# Patient Record
Sex: FEMALE | Race: BLACK OR AFRICAN AMERICAN | Employment: FULL TIME | URBAN - METROPOLITAN AREA
[De-identification: names, ages, dates, MRNs, and addresses within clinical notes are randomized per-mention and may not be internally consistent; named-entity substitution may affect disease eponyms.]

---

## 2018-09-14 ENCOUNTER — HOSPITAL ENCOUNTER (EMERGENCY)
Age: 33
Discharge: HOME | End: 2018-09-14
Attending: EMERGENCY MEDICINE
Payer: MEDICAID

## 2018-09-14 ENCOUNTER — APPOINTMENT (OUTPATIENT)
Dept: GENERAL RADIOLOGY | Age: 33
End: 2018-09-14
Attending: PHYSICIAN ASSISTANT
Payer: MEDICAID

## 2018-09-14 VITALS
SYSTOLIC BLOOD PRESSURE: 163 MMHG | OXYGEN SATURATION: 98 % | DIASTOLIC BLOOD PRESSURE: 111 MMHG | RESPIRATION RATE: 18 BRPM | HEART RATE: 68 BPM | TEMPERATURE: 98.4 F

## 2018-09-14 DIAGNOSIS — S92.252A CLOSED DISPLACED FRACTURE OF NAVICULAR BONE OF LEFT FOOT, INITIAL ENCOUNTER: Primary | ICD-10-CM

## 2018-09-14 DIAGNOSIS — R03.0 ELEVATED BLOOD PRESSURE READING: ICD-10-CM

## 2018-09-14 PROCEDURE — 73630 X-RAY EXAM OF FOOT: CPT

## 2018-09-14 PROCEDURE — 75810000053 HC SPLINT APPLICATION

## 2018-09-14 PROCEDURE — 74011250637 HC RX REV CODE- 250/637: Performed by: EMERGENCY MEDICINE

## 2018-09-14 PROCEDURE — 99283 EMERGENCY DEPT VISIT LOW MDM: CPT

## 2018-09-14 RX ORDER — HYDROCODONE BITARTRATE AND ACETAMINOPHEN 5; 325 MG/1; MG/1
1 TABLET ORAL
Qty: 10 TAB | Refills: 0 | Status: SHIPPED | OUTPATIENT
Start: 2018-09-14

## 2018-09-14 RX ORDER — IBUPROFEN 600 MG/1
600 TABLET ORAL
Status: COMPLETED | OUTPATIENT
Start: 2018-09-14 | End: 2018-09-14

## 2018-09-14 RX ADMIN — IBUPROFEN 600 MG: 600 TABLET ORAL at 23:19

## 2018-09-14 NOTE — LETTER
NOTIFICATION RETURN TO WORK / SCHOOL 
 
9/14/2018 11:25 PM 
 
Ms. Juan J Jha [address] [address] To Whom It May Concern: 
 
Juan J Jha is currently under the care of [address] [place] [address] EMERGENCY DEPT. She will return to work/school on: 9/17/18 Nonweightbearing left foot until cleared by orthopedics If there are questions or concerns please have the patient contact our office.  
 
 
 
Sincerely, 
 
 
Michelle Sarah MD

## 2018-09-15 NOTE — DISCHARGE INSTRUCTIONS
Broken Foot: Care Instructions  Your Care Instructions    A broken foot, or foot fracture, is a break in one or more of the bones in your foot. It may happen because of a sports injury, a fall, or other accident. A compound, or open, fracture occurs when a bone breaks through the skin. A break that does not poke through the skin is a closed fracture. Your treatment depends on the location and type of break in your foot. You may need a splint, a cast, or an orthopedic shoe. Certain kinds of injuries may need surgery at some time. Whatever your treatment, you can ease symptoms and help your foot heal with care at home. You may need 6 to 8 weeks or more to fully heal.  You heal best when you take good care of yourself. Eat a variety of healthy foods, and don't smoke. Follow-up care is a key part of your treatment and safety. Be sure to make and go to all appointments, and call your doctor if you are having problems. It's also a good idea to know your test results and keep a list of the medicines you take. How can you care for yourself at home? · Be safe with medicines. Take pain medicines exactly as directed. ¨ If the doctor gave you a prescription medicine for pain, take it as prescribed. ¨ If you are not taking a prescription pain medicine, ask your doctor if you can take an over-the-counter medicine. · Leave the splint on until your follow-up appointment. Do not put any weight on the injured foot. If you were given crutches, use them as directed. · Put ice or a cold pack on your foot for 10 to 20 minutes at a time. Try to do this every 1 to 2 hours for the next 3 days (when you are awake) or until the swelling goes down. Put a thin cloth between the ice and your skin. · Prop up the sore foot on a pillow anytime you sit or lie down during the next 3 days. Try to keep it above the level of your heart. This will help reduce swelling. · Follow the cast care instructions your doctor gives you.  If you have a splint, do not take it off unless your doctor tells you to. Cast and splint care  · If you have a removable splint, ask your doctor if it is okay to remove it to bathe. Your doctor may want you to keep it on as much as possible. · Keep your plaster splint covered by taping a sheet of plastic around it when you bathe. Water under the plaster can cause your skin to itch and hurt. · Never cut your splint. When should you call for help? Call 911 anytime you think you may need emergency care. For example, call if:    · You have chest pain, are short of breath, or you cough up blood.    Call your doctor now or seek immediate medical care if:    · You have new or worse pain.     · Your foot is cool or pale or changes color.     · You have tingling, weakness, or numbness in your toes.     · Your cast or splint feels too tight.     · You have signs of a blood clot in your leg (called a deep vein thrombosis), such as:  ¨ Pain in your calf, back of the knee, thigh, or groin. ¨ Redness or swelling in your leg.    Watch closely for changes in your health, and be sure to contact your doctor if:    · You have a problem with your splint or cast.     · You do not get better as expected. Where can you learn more? Go to http://gigi-priscilla.info/. Enter S121 in the search box to learn more about \"Broken Foot: Care Instructions. \"  Current as of: November 29, 2017  Content Version: 11.7  © 7084-8585 [employer]. Care instructions adapted under license by [employer] (which disclaims liability or warranty for this information). If you have questions about a medical condition or this instruction, always ask your healthcare professional. [address] any warranty or liability for your use of this information.

## 2018-09-15 NOTE — ED PROVIDER NOTES
EMERGENCY DEPARTMENT HISTORY AND PHYSICAL EXAM 
 
10:47 PM 
 
 
Date: 9/14/2018 Patient Name: Rico Hatfield History of Presenting Illness Chief Complaint Patient presents with  Foot Pain History Provided By: Patient Chief Complaint: foot pain Duration:  5 hours Timing:  Constant Location: left foot Severity: 6 out of 10 Modifying Factors: Pt tripped over a baby toy. She did not fall or hit her head Associated Symptoms: Denies knee pain, HA, and fever. Additional History (Context): Rico Hatfield is a 27 y.o. female with No significant past medical history who presents with constant 6/10 left foot pain that started 5 hours ago. Pt tripped over a baby toy. She did not fall or hit her head She did not take anything for pain PTA. Denies knee pain, HA, and fever. Pt says they are not able to put weight on the foot. PCP: Zack Herbert MD 
 
 
 
Past History Past Medical History: No past medical history on file. Past Surgical History: No past surgical history on file. Family History: No family history on file. Social History: 
Social History Substance Use Topics  Smoking status: Not on file  Smokeless tobacco: Not on file  Alcohol use Not on file Allergies: Allergies Allergen Reactions  Amoxicillin Hives Review of Systems Review of Systems Constitutional: Negative for diaphoresis and fever. HENT: Negative for congestion. Eyes: Negative for discharge. Respiratory: Negative for stridor. Cardiovascular: Negative for palpitations. Gastrointestinal: Negative for diarrhea. Genitourinary: Negative for flank pain. Musculoskeletal: Negative for back pain. Positive for left foot pain. Negative for knee pain. Neurological: Negative for weakness and headaches. Psychiatric/Behavioral: Negative for hallucinations. All other systems reviewed and are negative. Visit Vitals  BP (!) 163/111 (BP 1 Location: Right arm, BP Patient Position: At rest)  Pulse 68  Temp 98.4 °F (36.9 °C)  Resp 18  SpO2 98% Physical Exam / Medical Decision Making I am the first provider for this patient. I reviewed the vital signs, available nursing notes, past medical history, past surgical history, family history and social history. Vital Signs-Reviewed the patient's vital signs. Physical exam: 
General:  Well-developed, well-nourished, no apparent distress Head:  Normocephalic atraumatic Eyes:  Pupils midrange extraocular movements grossly intact. Nose:  No rhinorrhea, inspection grossly normal   
Ears:  Grossly normal to inspection Mouth:  Mucous membranes moist   
Neck/Back:  Trachea midline, no asymmetry Chest:  Grossly normal inspection, symmetric chest rise, respirations nonlabored Extremities:  Grossly normal to inspection  LEFT lower extremity: No pain on palpation of the knee, there is marked pain on palpation of the dorsum of the LEFT foot there is no medial or lateral malleolus tenderness. 2+ dorsalis pedis pulses she has sensation intact to light touch. There is swelling overlying the dorsum of the foot. No  clear deformity or ecchymosis Neurologic:  Alert and oriented no appreciable focal neurologic deficit Psychiatric:  Grossly normal mood and affect Nursing note reviewed, vital signs reviewed. ED course: 
Patient with a exaggerated plantar flexion injury at her LEFT ankle after tripping, has pain overlying the dorsum of the foot no medial or lateral malleolus tenderness no concern that this is a ankle fracture, primary concern is for a midfoot injury or ligamentous injury. Declined a pregnancy test she was offered pain medication and opted for a ibuprofen by mouth X-ray per my interpretation avulsion fracture or dorsum of the LEFT foot navicular bone Procedure note: 
Splint Location:  Ankle Performed by staff under my direct supervision Splint type:  Posterior short-leg Material:  Kerricristela Mcdaniel Post procedure: body part was neurologically unchanged and patient tolerated procedure well Total time: 5 minutes Patient's presentation, history, physical exam and laboratory evaluations were reviewed. At this time patient was felt to be stable for outpatient management and follow with primary care/specialist.  Patient was instructed to return to the emergency department with any concerns. Disposition: 
 
Discharged home Portions of this chart were created with Dragon medical speech to text program.   Unrecognized errors may be present. Diagnostic Study Results Labs - No results found for this or any previous visit (from the past 12 hour(s)). Radiologic Studies -  
XR FOOT LT MIN 3 V    (Results Pending) Diagnosis Clinical Impression: 1. Closed displaced fracture of navicular bone of left foot, initial encounter 2. Elevated blood pressure reading Follow-up Information Follow up With Details Comments Contact Info Hawk Ruiz MD Call in 2 days  [place] [address] [address] [place] [address] 
[phone] [facility] EMERGENCY DEPT  As needed, If symptoms worsen [address] [facility] 
[phone] Patient's Medications Start Taking HYDROCODONE-ACETAMINOPHEN (NORCO) 5-325 MG PER TABLET    Take 1 Tab by mouth every four (4) hours as needed for Pain. Max Daily Amount: 6 Tabs. Continue Taking No medications on file These Medications have changed No medications on file Stop Taking No medications on file  
 
_______________________________ Attestations: 
Scribe Attestation Maryam Thomas acting as a scribe for and in the presence of Ibrahima Luo MD     
September 14, 2018 at 10:47 PM 
    
Provider Attestation: I personally performed the services described in the documentation, reviewed the documentation, as recorded by the scribe in my presence, and it accurately and completely records my words and actions. September 14, 2018 at 10:47 PM - Julianne Yang MD   
_______________________________

## 2023-01-13 ENCOUNTER — OFFICE VISIT (OUTPATIENT)
Dept: SURGERY | Age: 38
End: 2023-01-13
Payer: MEDICAID

## 2023-01-13 VITALS
RESPIRATION RATE: 16 BRPM | BODY MASS INDEX: 44.56 KG/M2 | OXYGEN SATURATION: 99 % | SYSTOLIC BLOOD PRESSURE: 143 MMHG | HEIGHT: 64 IN | WEIGHT: 261 LBS | DIASTOLIC BLOOD PRESSURE: 93 MMHG | TEMPERATURE: 98.1 F | HEART RATE: 80 BPM

## 2023-01-13 DIAGNOSIS — K21.9 GASTROESOPHAGEAL REFLUX DISEASE, UNSPECIFIED WHETHER ESOPHAGITIS PRESENT: ICD-10-CM

## 2023-01-13 DIAGNOSIS — J45.909 ASTHMA, UNSPECIFIED ASTHMA SEVERITY, UNSPECIFIED WHETHER COMPLICATED, UNSPECIFIED WHETHER PERSISTENT: ICD-10-CM

## 2023-01-13 DIAGNOSIS — D50.9 IRON DEFICIENCY ANEMIA, UNSPECIFIED IRON DEFICIENCY ANEMIA TYPE: ICD-10-CM

## 2023-01-13 DIAGNOSIS — Z01.818 PRE-OP TESTING: ICD-10-CM

## 2023-01-13 DIAGNOSIS — E66.01 MORBID OBESITY DUE TO EXCESS CALORIES (HCC): Primary | ICD-10-CM

## 2023-01-13 DIAGNOSIS — I10 HYPERTENSION, UNSPECIFIED TYPE: ICD-10-CM

## 2023-01-13 RX ORDER — AMLODIPINE BESYLATE 10 MG/1
10 TABLET ORAL DAILY
COMMUNITY

## 2023-01-13 NOTE — PROGRESS NOTES
Frida Bae is a 42 y.o. female (: 1985) presenting to address:    Chief Complaint   Patient presents with    New Patient     Bariatric consult       Medication list and allergies have been reviewed with Frida Bae and updated as of today's date. I have gone over all Medical, Surgical and Social History with Frida Bae and updated/added the information accordingly.

## 2023-01-13 NOTE — PROGRESS NOTES
Bariatric Surgery Consultation    CC: Consultation regarding surgical treatment options for morbid obesity and related comorbidities  Subjective: The patient is a 42 y.o. obese  female with a Body mass index is 44.8 kg/m². . They have been considering surgery for some time now. The patient presents to the clinic today to discuss surgical weight loss options. They have made multiple attempts at weight loss over the years without success. They have tried low-carb, low calorie, high-protein diets. Unfortunately, none of these have lead to meaningful, sustained weight loss. They have attended the bariatric seminar before the visit. Denies nausea, vomiting, dysphagia  Rare reflux, intermittent, triggered by carbonated beverages or red sauces, self-limited. EXERCISE:   Walking regimen    Sleep Apnea assessment:    STOPBANG questionnaire     Do you Snore loudly? yes  Do you often feel Tired, fatigued, or sleepy during the daytime? yes  Has anyone Observed you stop breathing during your sleep? no  Are you being treated for high blood Pressure? yes  BMI more than 35 kg/m2?  yes  Age over 48years old? no  Neck Circumference >16 inches? no  Gender male? no  ______________________________________     SCORE: 4     If YES to 0 - 2, low risk of sleep apnea  If YES to 3 - 4 intermediate risk of having sleep apnea  If YES to 5 - 8 high risk of having sleep apnea (or 2 + BMI 35 or Neck > 17\" or Male)     Pre op weight: 261  EBW: 130  Goal Weight Calc Women: 157  Wt loss to date: 0     Patient Active Problem List    Diagnosis Date Noted    Morbid obesity due to excess calories ([facility] [address] 01/13/2023    Asthma 01/13/2023    Hypertension 06/11/2017      Past Medical History:   Diagnosis Date    Hypertension      Past Surgical History:   Procedure Laterality Date    HX ORTHOPAEDIC      left ankle ORIF for fracture    [place] SKIN GRAFT      bilateral upper extremities      Social History     Tobacco Use    Smoking status: Never    Smokeless tobacco: Never   Substance Use Topics    Alcohol use: Never      History reviewed. No pertinent family history. Prior to Admission medications    Medication Sig Start Date End Date Taking? Authorizing Provider   amLODIPine (NORVASC) 10 mg tablet Take 10 mg by mouth daily. Yes Provider, Historical     Allergies   Allergen Reactions    Amoxicillin Hives        Review of Systems:    Constitutional: No fever or chills  Neurologic: No headache  Eyes: No scleral icterus or irritated eyes  Nose: No nasal pain or drainage  Mouth: No oral lesions or sore throat  Cardiac: No palpations or chest pain  Pulmonary: No cough or shortness of breath  Gastrointestinal: No nausea, emesis, diarrhea, or constipation  Genitourinary: No dysuria  Musculoskeletal: No muscle or joint tenderness  Skin: No rashes or lesions  Psychiatric: No anxiety or depressed mood    Pertinent positives: see HPI      Objective:     Physical Exam:  Visit Vitals  BP (!) 143/93 (BP 1 Location: Left upper arm)   Pulse 80   Temp 98.1 °F (36.7 °C)   Resp 16   Ht 5' 4\" (1.626 m)   Wt 118.4 kg (261 lb)   SpO2 99%   BMI 44.80 kg/m²     General: No acute distress, conversant  Eyes: PERRLA, no scleral icterus  HENT: Normocephalic without oral lesions  Neck: Trachea midline  Cardiac: Normal pulse rate and rhythm, no edema, capillary refill normal 1 second  Pulmonary: Symmetric chest rise with normal effort, clear to auscultation though mildly obscured by body habitus  GI: Soft, NT, ND, no hernia, no splenomegaly  Skin: Warm without rash  Extremities: No edema or joint stiffness, moves all extremities symmetrically, steady gait  Psych: Appropriate mood and affect    Assessment:     Morbid obesity with comorbidities  Plan:   The patient presents today with severe obesity and obesity related risks/comorbidities as detailed above. The patient has made multiple unsuccessful attempts at weight loss as detailed above.  The patient desires surgical weight loss for a better chance of lifelong weight control and control of co-morbidities. They have attended the bariatric seminar and meet the qualifications for surgery based on the NIH criteria. We have discussed the various surgical options including the sleeve gastrectomy, gastric bypass, duodenal switch/modified duodenal switch. We also discussed non operative alternatives. The patient is currently interested in the gastric bypass. I believe they are a good candidate for this procedure. They will need to a/an UGI to evaluate their anatomy pre operatively. H pylori antigen/breath test ordered as well. We have discussed the possible complications of bariatric surgery which include but are not limited to failed weight loss, weight regain, malnutrition, leak, bleed, stricture, gastric ulcer, gastric fistula, gastric bleed, gallstones, new or worsening gastric reflux, nausea, emesis, internal hernia, abdominal wall hernia, gastric perforation, need for revision / conversion / or reversal, pregnancy complications and loss, intestinal ischemia, post operative skin complications, possible thinning of their hair, bowel obstruction, dumping syndrome, wound infection, blood clots (DVT, mesenteric thrombus, pulmonary embolism), increased addictive tendency, risk of anesthesia, and death. The patient understands this is a life altering decision and will require compliance to the program for the remainder of their life in order to be monitored to avoid complication and ensure successful, sustained weight control. They will be placed on a lifelong low carbohydrate and low sugar diet, exercise, and vitamin regimen and will require frequent blood draws and office visits to ensure adequate nutrition and program compliance. Visits and follow up will be in compliance with the guidelines set forth by [facility].  I have specifically mentioned the need to avoid all personal and second-hand tobacco exposure, systemic steroids, and NSAIDS after any bariatric surgery to help avoid the above listed complications. The patient has expressed understanding of the above and would like to enroll in the program. The patient will be submitted for medical and psychological clearance along with establishing with out dietician and joining the pre / post operative support group. They will be screened for depression and sleep apnea and treated pre operatively if needed. After successful completion of the preoperative regimen the patient will be submitted for insurance approval and pending this will be scheduled for surgery. Tests/clearances ordered:  CBC, CMP, A1c, H pylori, Vitamin D, CXR, EKG, UGI  Nutrition, support group, PCP clearance, psychological clearance      All questions from the patient have been answered and they have demonstrated appropriate understanding of the process.       Signed By: Alva Lei MD [facility]  Bariatric and General Surgeon  [address] [place] [address] [facility]    January 13, 2023

## 2023-01-13 NOTE — Clinical Note
1/13/2023    Patient: Darren Diamond   YOB: 1985   Date of Visit: 1/13/2023     Izaiah Redman MD  Via     Dear Izaiah Redman MD,      Thank you for referring Ms. Jasmeet García to [facility] for evaluation. My notes for this consultation are attached. If you have questions, please do not hesitate to call me. I look forward to following your patient along with you.       Sincerely,    Freya Parker MD

## 2023-01-23 ENCOUNTER — DOCUMENTATION ONLY (OUTPATIENT)
Dept: BARIATRICS/WEIGHT MGMT | Age: 38
End: 2023-01-23

## 2023-01-23 NOTE — PROGRESS NOTES
1/23/23:  Patient did not show for her nutrition visit. She did not complete the requirement that was sent on 3 separate occasions. I have attempted to reach out to patient twice to see if she was still interested in surgery, but her voice mail box is full.       Arthur Muir MS RD